# Patient Record
Sex: MALE | Race: WHITE | Employment: OTHER | URBAN - METROPOLITAN AREA
[De-identification: names, ages, dates, MRNs, and addresses within clinical notes are randomized per-mention and may not be internally consistent; named-entity substitution may affect disease eponyms.]

---

## 2019-07-16 ENCOUNTER — HOSPITAL ENCOUNTER (OUTPATIENT)
Dept: PHYSICAL THERAPY | Age: 84
Discharge: HOME | End: 2019-07-16
Payer: MEDICARE

## 2019-07-16 PROCEDURE — 97110 THERAPEUTIC EXERCISES: CPT | Performed by: PHYSICAL THERAPIST

## 2019-07-16 PROCEDURE — 97530 THERAPEUTIC ACTIVITIES: CPT | Performed by: PHYSICAL THERAPIST

## 2019-07-16 PROCEDURE — 97162 PT EVAL MOD COMPLEX 30 MIN: CPT | Performed by: PHYSICAL THERAPIST

## 2019-07-16 NOTE — PROGRESS NOTES
Corry Dennison. : 3/5/1932  Primary: Waldemar Lawrence Medicare Advantage  Secondary:  [address]  [phone]   [phone]      OUTPATIENT PHYSICAL THERAPY: Daily Treatment Note  2019              ICD-10: Treatment Diagnosis: Other abnormalities of gait and mobility (R26.89)  Muscle weakness (generalized) (M62.81)       _________________________________________________________________________   TREATMENT PLAN:  Effective Dates: 2019 TO 10/14/2019 (90 days). Frequency/Duration: 2-3 times a week for 90 Days    GOALS: (Goals have been discussed and agreed upon with patient.)  Short-Term Functional Goals: Time Frame: 3 weeks  1. Pt will be able to improve his Timed Up and Go score (using a cane) by at least 5 seconds noting improving transfer and gait speed. 2. Pt will be able to complete a full bridge noting improving functional strength and improving mobility . 3. Pt will be instructed in correct use of a rollator and quad cane to improve stability with gait. Discharge Goals: Time Frame: 8-12 weeks  1. Pt will be able to sit to stand on first attempt from a standard chair. 2. Pt will be able to safely ambulate with least restrictive device without reaching for furniture or walls to balance and without report of any falls consistently for at least 2 weeks. 3. Pt will be able to decrease Timed Up and Go score (using least restrictive device) < 20 seconds noting significant improved functional mobility. Pre-treatment Symptoms/Complaints:  Frequent falls  Pain: Initial: 0/10 No c/o pain, but pt's wife reports frequent falls, more over the past month. Post Session:  No c/o   Medications Last Reviewed:  2019    Updated Objective Findings:  See evaluation note from today     TREATMENT:   Therapeutic Exercise: (SEE MINUTES BELOW):  Exercises per grid below to improve mobility, strength and balance. Required moderate visual, verbal and tactile cues to promote proper body alignment and promote proper body posture. Therapeutic Activity: (SEE MINUTES BELOW) Therapeutic activities performed with Enmanuel to improve transfers and improve safety with ambulation       Date: 7/16/19  Eval       Modalities:                                Manual Therapy:                                                        Therapeutic Exercises: 10 minutes       Supine hamstring stretch 2 H 30        hooklying hip abduction with theraband Blue x15                                                                                                                                       Therapeutic Activities: 20 minutes       Sit to stand  From elevated high/low table  x10 CGA       bridging x10       Gait training with prong cane Ambulating in clinic 20 ft then turn and ambulate 20 ft back 3x                                                                   HEP: Pt and his wife instructed in HEP and issued handout. [employer] Portal  Treatment/Session Summary:    · Response to Treatment:  Pt has difficulty hearing and also is slow to follow commands. Pt was instructed to increase stride length with gait and how to properly place the cane. Pt needed HHA and cane as reaches for furniture when walking. Pt's wife was instructed to bring his rollator walker for safety, but she states he only uses for walking further distances such as the dining fox. Pt prefers the cane. · Communication/Consultation:  None today  · Equipment provided today:  None today  · Recommendations/Intent for next treatment session: Next visit will focus on further gait and balance training, LE stretching and strengthening.      Total Treatment Billable Duration:  55 minutes  PT Patient Time In/Time Out  Time In: 1320  Time Out: 1415  Shaila Araya, PT

## 2019-07-16 NOTE — THERAPY EVALUATION
Sunita Florentino. : 3/5/1932 [address] @ [address]  [phone]   [phone]        OUTPATIENT PHYSICAL THERAPY:Initial Assessment 2019        ICD-10: Treatment Diagnosis: Other abnormalities of gait and mobility (R26.89)  Muscle weakness (generalized) (M62.81)  Precautions/Allergies: Carbidopa-Levodopa  Patient has no allergy information on record. Fall Risk Score:    Ambulatory/Rehab Services H2 Model Falls Risk Assessment    Risk Factors:       (4)  Confusion/Disorientation/Impulsivity       (1)  Gender [Male]       (5)  History of Recent Falls [w/in 3 months] Ability to Rise from Chair:       (3)  Multiple attempts, but successful1    Falls Prevention Plan:       Physical Limitations to Exercise (specify):  requires CGA during standing exercises and will use gait belt        Mobility Assistance Device (specify):  recommend walker; pt currently uses canes and walker   Total: (5 or greater = High Risk): 13     [facility] of [employer]. All Rights Reserved. [facility] Patent #[id number]. Federal Law prohibits the replication, distribution or use without written permission from [facility] [place]     MD Orders: Evaluate and Treat: Recurrent Falls, Parkinson's Disease MEDICAL/REFERRING DIAGNOSIS:  Parkinson's disease [G20]  Repeated falls [R29.6]   DATE OF ONSET: chronic   REFERRING PHYSICIAN: Marce James MD  RETURN PHYSICIAN APPOINTMENT: 20     INITIAL ASSESSMENT:  Mr. Jackie Rivas is an 79 y/o male with Parkinson's Disease and has recently began falling. Pt has difficulty walking, decreased balance, and decreased flexibility. Pt has increased difficulty with transfers. Pt has poor posture, decreased overall strength, and decreased flexibility.   Pt will benefit from PT for gait and balance training as well as stretching and core/LE strengthening to improve safety with gait and transfers as well as decrease fall risk    PROBLEM LIST (Impacting functional limitations):  1. Decreased Strength  2. Decreased ADL/Functional Activities  3. Decreased Transfer Abilities  4. Decreased Ambulation Ability/Technique  5. Decreased Balance  6. Decreased Activity Tolerance  7. Decreased Flexibility/Joint Mobility  8. Decreased [place] with Home Exercise Program INTERVENTIONS PLANNED:  1. Balance Exercise  2. Gait Training  3. Home Exercise Program (HEP)  4. Manual Therapy  5. Range of Motion (ROM)  6. Therapeutic Activites  7. Therapeutic Exercise/Strengthening  8. Transfer Training    TREATMENT PLAN:  Effective Dates: 7/16/2019 TO 10/14/2019 (90 days). Frequency/Duration: 2-3 times a week for 90 Days    GOALS: (Goals have been discussed and agreed upon with patient.)  Short-Term Functional Goals: Time Frame: 3 weeks  1. Pt will be able to improve his Timed Up and Go score (using a cane) by at least 5 seconds noting improving transfer and gait speed. 2. Pt will be able to complete a full bridge noting improving functional strength and improving mobility . 3. Pt will be instructed in correct use of a rollator and quad cane to improve stability with gait. Discharge Goals: Time Frame: 8-12 weeks  1. Pt will be able to sit to stand on first attempt from a standard chair. 2. Pt will be able to safely ambulate with least restrictive device without reaching for furniture or walls to balance and without report of any falls consistently for at least 2 weeks. 3. Pt will be able to decrease Timed Up and Go score (using least restrictive device) < 20 seconds noting significant improved functional mobility. Rehabilitation Potential For Stated Goals: Fair  Regarding Alfred Krabbe Jr.'s therapy, I certify that the treatment plan above will be carried out by a therapist or under their direction.   Thank you for this referral,  Shaila Araya, PT       Referring Physician Signature: Luciano Ruiz MD              Date HISTORY:   History of Present Injury/Illness (Reason for Referral):  Pt's wife reports pt was referred to PT after consulting MD about recent increase in falls. Over the past month, he has fallen 4x and without injury. In the previous 3 months, he had fallen only twice during that time. Pt has Parkinson's and a history of TIA. Pt's wife reports his falls have been standing up from getting out of chair with his legs appearing to \"go weak. \" Also, pt has fallen by missing a chair when sitting back and also he fell in the bathroom with unknown cause. Pt's wife states his memory is poor. Pt's wife had him quit using a quad cane because he was safely using the cane. He also has long walking \"poles\" and a prong cane he uses around the home. Pt's wife states he only uses his rollator if they are going to the dining fox or out for longer community distances. Past Medical History/Comorbidities:   Mr. Nicky Mendoza  has no past medical history on file. Mr. Nicky Mendoza  has no past surgical history on file. TIA  Social History/Living Environment:     lives independent living on 2nd floor with his wife at the [facility] Level of Function/Work/Activity:  Independent with dressing/bathing, pt's wife assists with household tasks meals and often eat in dining fox  Current Medications:  No current outpatient medications on file.  pt's wife reports he only takes vitamins   Date Last Reviewed:  7/16/2019   # of Personal Factors/Comorbidities that affect the Plan of Care: 1-2: MODERATE COMPLEXITY   EXAMINATION:   Observation/Orthostatic Postural Assessment:          Increased forward flexed posture; kyphotic, severe forward head posture; increased B knee flexion in standing  Palpation: unremarkable   ROM:    Increased stiffness throughout LEs with hip extension tight to achieve neutral but could with AAROM; SLR 50 ° BLE;   Knee extension lacking -14 ° L from full extension;   -7 °  R                    Strength:     Date:  7/16/19 Date:   Date:     LE MMT Left Right Left Right Left Right   Hip Flex (L1/L2) 3+/5 3+/5       Hip Abd (glut med) 3+/5 3+/5       Hip Ext minmial bridge only        Quad    ( L3/4) 3+/5 3+/5       Hamstring 3+/5 3+/5       Anterior Tib (L4/L5) 3+/5 3+/5       Gastroc (S1/2)         EHL (L5)                             Special Tests: see Timed Up and Go below; pt could not recall his birth date, the president or what he had for breakfast, pt did know his son's name  Neurological Screen: intact to light touch BLEs  Functional Mobility:         Gait/Ambulation:  Shuffling, very short strides, crouched flexed knees; using prong cane in R hand, cane too far out in front and tilted at base        Transfers:  Slow but Mod I with multiple attempts with sit to stand using BUEs        Bed Mobility:  Mod I with increased time         Stairs:  Pt's wife reports he can perform with rail and stairs but slow   Balance:  Poor; requires UE assist to steady balance; Romberg needed assist to assume and unable to hold but 5 seconds   Body Structures Involved:  1. Nerves  2. Bones  3. Joints  4. Muscles Body Functions Affected:  1. Neuromusculoskeletal  2. Movement Related Activities and Participation Affected:  1. Learning and Applying Knowledge  2. General Tasks and Demands  3. Communication  4. Mobility  5. Self Care  6. Domestic Life  7. Interpersonal Interactions and Relationships  8. Community, Social and [place]   # of elements that affect the Plan of Care: 4+: HIGH COMPLEXITY   CLINICAL PRESENTATION:   Presentation: Evolving clinical presentation with changing clinical characteristics: MODERATE COMPLEXITY   CLINICAL DECISION MAKING:   Tool Used: Timed Up and Go (TUG)  Score:  Initial: 45 seconds Most Recent: X seconds (Date: -- )   Interpretation of Score:  The test measures, in seconds, the time taken by an individual to stand up from a standard arm chair (seat height 46 cm [18 in], arm height 65 cm [25.6 in]), walk a distance of 3 meters (118 in, approx 10 ft), turn, walk back to the chair and sit down. If the individual takes longer than 14 seconds to complete TUG, this indicates risk for falls. Medical Necessity:   · Patient is expected to demonstrate progress in strength, range of motion, balance and functional technique to improve safety during transfers and gait. Reason for Services/Other Comments:  · Patient continues to require modification of therapeutic interventions to increase complexity of exercises.    Use of outcome tool(s) and clinical judgement create a POC that gives a: Questionable prediction of patient's progress: MODERATE COMPLEXITY       Total Treatment Duration:  PT Patient Time In/Time Out  Time In: 1320  Time Out: 1415     Shaila Araya, PT

## 2019-07-18 ENCOUNTER — HOSPITAL ENCOUNTER (OUTPATIENT)
Dept: PHYSICAL THERAPY | Age: 84
End: 2019-07-18
Payer: MEDICARE

## 2019-07-19 ENCOUNTER — HOSPITAL ENCOUNTER (OUTPATIENT)
Dept: PHYSICAL THERAPY | Age: 84
Discharge: HOME | End: 2019-07-19
Payer: MEDICARE

## 2019-07-19 PROCEDURE — 97110 THERAPEUTIC EXERCISES: CPT

## 2019-07-19 PROCEDURE — 97530 THERAPEUTIC ACTIVITIES: CPT

## 2019-07-19 NOTE — PROGRESS NOTES
Juan Silveira. : 3/5/1932  Primary: Maciel Hdz [employer] Medicare Advantage  Secondary:  [address] @ [address]  [facility] [address]  [phone]   [phone]      OUTPATIENT PHYSICAL THERAPY: Daily Treatment Note  2019              ICD-10: Treatment Diagnosis: Other abnormalities of gait and mobility (R26.89)  Muscle weakness (generalized) (M62.81)       _________________________________________________________________________   TREATMENT PLAN:  Effective Dates: 2019 TO 10/14/2019 (90 days). Frequency/Duration: 2-3 times a week for 90 Days    GOALS: (Goals have been discussed and agreed upon with patient.)  Short-Term Functional Goals: Time Frame: 3 weeks  1. Pt will be able to improve his Timed Up and Go score (using a cane) by at least 5 seconds noting improving transfer and gait speed. 2. Pt will be able to complete a full bridge noting improving functional strength and improving mobility . 3. Pt will be instructed in correct use of a rollator and quad cane to improve stability with gait. Discharge Goals: Time Frame: 8-12 weeks  1. Pt will be able to sit to stand on first attempt from a standard chair. 2. Pt will be able to safely ambulate with least restrictive device without reaching for furniture or walls to balance and without report of any falls consistently for at least 2 weeks. 3. Pt will be able to decrease Timed Up and Go score (using least restrictive device) < 20 seconds noting significant improved functional mobility. Pre-treatment Symptoms/Complaints:  Pt and spouse report no fall in last 2 days (since previous tx session)     Pain:  Post Session:  No c/o   Medications Last Reviewed:  2019    Updated Objective Findings:  No updated finding today     TREATMENT:   Therapeutic Exercise: (SEE MINUTES BELOW):  Exercises per grid below to improve mobility, strength and balance.   Required moderate visual, verbal and tactile cues to promote proper body alignment and promote proper body posture. Therapeutic Activity: (SEE MINUTES BELOW) Therapeutic activities performed with Enmanuel to improve transfers and improve safety with ambulation       Date: 7/16/19  Eval 7/19/19  Visit 2      Modalities:                                Manual Therapy:                                                        Therapeutic Exercises: 10 minutes 35 mins      Supine hamstring stretch 2 H 30        hooklying hip abduction with theraband Blue x15       Seated march - alternating   3# x20B      LAQ alternating  In chair       Seated clamshells  grn tb x20      Seated calf raises  3# on knees x20      Seated dorsiflexion  Red tb x20                      Standing B shldr extn  CGA with gait belt  x10                      Supine shoulder flexion  Red tb x10ea L&R      Supine shoulder horz abd  Red tb x10       Supine LTR  X20                                                              Therapeutic Activities: 20 mins  25 mins      Sit to stand  From elevated high/low table  x10 CGA x10 chair plus 1 cushion, CGA      bridging x10 X10 also  X10 with hip add ball sqz      Gait training with prong cane Ambulating in clinic 20 ft then turn and ambulate 20 ft back 3x   Ambulating in clinic 20 ft then turn and ambulate 20 ft back 4x total between exercises      Standing marching on AirEx foam pad  x20B CGA                                                        HEP: Pt and his wife instructed in HEP and issued handout. [employer] Portal  Treatment/Session Summary:    · Response to Treatment: Gait belt and SBA/CGA provided throughout treatment session. Pt has difficulty hearing, He demonstrates slow processing of requests and follow through. Pt demonstrates altered gait with decreased step length, has more shuffling gait pattern. . Pt needed HHA and cane as reaches for furniture when walking. Pt's wife brought in [facility] for gait training. Quad cane his heavy and more difficult to control. · Communication/Consultation:  None today  · Equipment provided today:  None today  · Recommendations/Intent for next treatment session: Next visit will focus on further gait and balance training, LE stretching and strengthening.      Total Treatment Billable Duration:  60 minutes  PT Patient Time In/Time Out  Time In: 1330  Time Out: [address] PTA

## 2019-07-23 ENCOUNTER — APPOINTMENT (OUTPATIENT)
Dept: PHYSICAL THERAPY | Age: 84
End: 2019-07-23
Payer: MEDICARE

## 2019-07-25 ENCOUNTER — APPOINTMENT (OUTPATIENT)
Dept: PHYSICAL THERAPY | Age: 84
End: 2019-07-25
Payer: MEDICARE

## 2019-07-26 ENCOUNTER — HOSPITAL ENCOUNTER (OUTPATIENT)
Dept: PHYSICAL THERAPY | Age: 84
Discharge: HOME | End: 2019-07-26
Payer: MEDICARE

## 2019-07-26 PROCEDURE — 97110 THERAPEUTIC EXERCISES: CPT

## 2019-07-26 NOTE — PROGRESS NOTES
Rigo Bergman. : 3/5/1932  Primary: Julita Lawrence Medicare Advantage  Secondary:  Erma Neff @ [address]  [address] [facility] [place].  [phone]   [phone]      OUTPATIENT PHYSICAL THERAPY: Daily Treatment Note  2019              ICD-10: Treatment Diagnosis: Other abnormalities of gait and mobility (R26.89)  Muscle weakness (generalized) (M62.81)       _________________________________________________________________________   TREATMENT PLAN:  Effective Dates: 2019 TO 10/14/2019 (90 days). Frequency/Duration: 2-3 times a week for 90 Days    GOALS: (Goals have been discussed and agreed upon with patient.)  Short-Term Functional Goals: Time Frame: 3 weeks  1. Pt will be able to improve his Timed Up and Go score (using a cane) by at least 5 seconds noting improving transfer and gait speed. 2. Pt will be able to complete a full bridge noting improving functional strength and improving mobility . 3. Pt will be instructed in correct use of a rollator and quad cane to improve stability with gait. Discharge Goals: Time Frame: 8-12 weeks  1. Pt will be able to sit to stand on first attempt from a standard chair. 2. Pt will be able to safely ambulate with least restrictive device without reaching for furniture or walls to balance and without report of any falls consistently for at least 2 weeks. 3. Pt will be able to decrease Timed Up and Go score (using least restrictive device) < 20 seconds noting significant improved functional mobility. Pre-treatment Symptoms/Complaints:  Pt and spouse report no fall in last 2 days (since previous tx session)     Pain:  Post Session:  No c/o   Medications Last Reviewed:  2019    Updated Objective Findings:  No updated finding today     TREATMENT:   Therapeutic Exercise: (SEE MINUTES BELOW):  Exercises per grid below to improve mobility, strength and balance.   Required moderate visual, verbal and tactile cues to promote proper body alignment and promote proper body posture. Therapeutic Activity: (SEE MINUTES BELOW) Therapeutic activities performed with Enmanuel to improve transfers and improve safety with ambulation       Date: 7/16/19  Eval 7/19/19  Visit 2 07/26/19 visit 3     Modalities:                                Manual Therapy:                                                        Therapeutic Exercises: 10 minutes 35 mins 55 min     Supine hamstring stretch 2 H 30        hooklying hip abduction with theraband Blue x15       Seated march - alternating   3# x20B 2x15 BLE alternating     LAQ alternating  In chair  2x15 BLE     Seated clamshells  grn tb x20 2x10 green TB     Seated calf raises  3# on knees x20      Seated dorsiflexion  Red tb x20      Toe/ Heel raises   2x10 BLE alternating             Standing B shldr extn  CGA with gait belt  x10      Standing side/ forward wt shifts    X10 each direction     Standing reach activities   x7-10 crossing midline     Supine shoulder flexion  Red tb x10ea L&R      Supine shoulder horz abd  Red tb x10       Supine LTR  X20      Sit to stand   X10 with CGA     Step ups   X10 R/L on [employer] cushion     Standing/ postural exercises   5 min- incr. cues     Gait training   2x20, 2x20 with SPC                             Therapeutic Activities: 20 mins  25 mins      Sit to stand  From elevated high/low table  x10 CGA x10 chair plus 1 cushion, CGA      bridging x10 X10 also  X10 with hip add ball sqz      Gait training with prong cane Ambulating in clinic 20 ft then turn and ambulate 20 ft back 3x   Ambulating in clinic 20 ft then turn and ambulate 20 ft back 4x total between exercises      Standing marching on AirEx foam pad  x20B CGA                                                        HEP: Pt and his wife instructed in HEP and issued handout.      [employer] Portal  Treatment/Session Summary:    · Response to Treatment: Gait belt and SBA/CGA provided throughout treatment session. Pt has difficulty hearing- better hearing on R side with continued slow process processing of instructions/ cues and follow through. Pt demonstrates altered gait with decreased step length with festinating gait pattern- when he responds to cues for increased step length his posture corrects as well. Plan to continue on land at next visit. · Communication/Consultation:  None today  · Equipment provided today:  None today  · Recommendations/Intent for next treatment session: Next visit will focus on further gait and balance training, LE stretching and strengthening.      Total Treatment Billable Duration:  55 minutes  PT Patient Time In/Time Out  Time In: 1100  Time Out: Matti 90, PT

## 2019-08-01 ENCOUNTER — APPOINTMENT (OUTPATIENT)
Dept: PHYSICAL THERAPY | Age: 84
End: 2019-08-01
Payer: MEDICARE

## 2019-08-15 ENCOUNTER — HOSPITAL ENCOUNTER (OUTPATIENT)
Dept: PHYSICAL THERAPY | Age: 84
Discharge: HOME | End: 2019-08-15
Payer: MEDICARE

## 2019-08-15 PROCEDURE — 97110 THERAPEUTIC EXERCISES: CPT

## 2019-08-15 NOTE — PROGRESS NOTES
Gerardantonio Brock. : 3/5/1932  Primary: Mitul Lawrence Medicare Advantage  Secondary:  Janis Guerrero @ [address]  [phone]   [phone]      OUTPATIENT PHYSICAL THERAPY: Daily Treatment Note  8/15/2019              ICD-10: Treatment Diagnosis: Other abnormalities of gait and mobility (R26.89)  Muscle weakness (generalized) (M62.81)       _________________________________________________________________________   TREATMENT PLAN:  Effective Dates: 2019 TO 10/14/2019 (90 days). Frequency/Duration: 2-3 times a week for 90 Days    GOALS: (Goals have been discussed and agreed upon with patient.)  Short-Term Functional Goals: Time Frame: 3 weeks  1. Pt will be able to improve his Timed Up and Go score (using a cane) by at least 5 seconds noting improving transfer and gait speed. 2. Pt will be able to complete a full bridge noting improving functional strength and improving mobility . 3. Pt will be instructed in correct use of a rollator and quad cane to improve stability with gait. Discharge Goals: Time Frame: 8-12 weeks  1. Pt will be able to sit to stand on first attempt from a standard chair. 2. Pt will be able to safely ambulate with least restrictive device without reaching for furniture or walls to balance and without report of any falls consistently for at least 2 weeks. 3. Pt will be able to decrease Timed Up and Go score (using least restrictive device) < 20 seconds noting significant improved functional mobility. Pre-treatment Symptoms/Complaints:  Pt states \"I feel ok. \"    Pain:  0/10 Post Session:  No c/o   Medications Last Reviewed:  8/15/2019    Updated Objective Findings:  Pt transferred from standgng to sitting with increased control with descent     TREATMENT:   Therapeutic Exercise: (SEE MINUTES BELOW):  Exercises per grid below to improve mobility, strength and balance.   Required moderate visual, verbal and tactile cues to promote proper body alignment and promote proper body posture. Therapeutic Activity: (SEE MINUTES BELOW) Therapeutic activities performed with Enmanuel to improve transfers and improve safety with ambulation       Date: 7/16/19  Eval 7/19/19  Visit 2 07/26/19 visit 3 8/15/19 (visit 4)    Modalities:                                Manual Therapy:                                                        Therapeutic Exercises: 10 minutes 35 mins 55 min 60 min with CGA and gait belt    Supine hamstring stretch 2 H 30        hooklying hip abduction with theraband Blue x15   Side lying abduction 2x10 B with tactile cues    Seated march - alternating   3# x20B 2x15 BLE alternating With raised mat table for UE support x30 standing    LAQ alternating  In chair  2x15 BLE x20 B LE with 3#    Seated clamshells  grn tb x20 2x10 green TB     Seated calf raises  3# on knees x20  Standing 3x10 with UE support and CGA    Seated dorsiflexion  Red tb x20      Toe/ Heel raises   2x10 BLE alternating DF with blue band x20 B and PF with 6# weight seated x20 BLE    Sit to stand with alternating toe tap    x10 with CGA and cane    Standing B shldr extn  CGA with gait belt  x10      Standing side/ forward wt shifts    X10 each direction     Standing reach activities   x7-10 crossing midline Ball toss- seated x30 without feet touching floor and x30 standing with CGA and pt tossing to another therapist    Supine shoulder flexion  Red tb x10ea L&R      Supine shoulder horz abd  Red tb x10       Supine LTR  X20      Sit to stand   X10 with CGA 2x10 CGA and x5 holding ball    Step ups   X10 R/L on AIREX cushion Onto 4 in step with UE support on L side and HHA on R side x10    Standing/ postural exercises   5 min- incr.  cues     Gait training   2x20, 2x20 with SPC 20 feet, 3 times, HHA and CGA with cane    bridges    2x10                    Therapeutic Activities: 20 mins  25 mins      Sit to stand  From elevated high/low table  x10 CGA x10 chair plus 1 cushion, CGA      bridging x10 X10 also  X10 with hip add ball sqz      Gait training with prong cane Ambulating in clinic 20 ft then turn and ambulate 20 ft back 3x   Ambulating in clinic 20 ft then turn and ambulate 20 ft back 4x total between exercises      Standing marching on AirEx foam pad  x20B CGA                                                        HEP: Pt and his wife instructed in HEP and issued handout. [employer]  Treatment/Session Summary:    · Response to Treatment: Pt is hard of hearing but he follows directions well. Pt has difficulty shifting weight from one LE to the next. His wife states that he has poor recall and she was advised to work on cueing the pt to slide forward in his chair before he attempts to stand up. He reported increased fatigue at the end of treatment and the therapist walked him to the car. Plan to continue on land at next visit. · Communication/Consultation:  None today  · Equipment provided today:  None today  · Recommendations/Intent for next treatment session: Next visit will focus on further gait and balance training, LE stretching and strengthening.      Total Treatment Billable Duration:  60 minutes  PT Patient Time In/Time Out  Time In: 0215  Time Out: 0315  Stephanie Myers, PTA

## 2019-08-20 ENCOUNTER — HOSPITAL ENCOUNTER (OUTPATIENT)
Dept: PHYSICAL THERAPY | Age: 84
Discharge: HOME | End: 2019-08-20
Payer: MEDICARE

## 2019-08-20 PROCEDURE — 97110 THERAPEUTIC EXERCISES: CPT | Performed by: PHYSICAL THERAPIST

## 2019-08-20 NOTE — PROGRESS NOTES
Filiberto Kay. : 3/5/1932  Primary: Cathy Simple [employer] Medicare Advantage  Secondary:  Revonda Burkitt @ [address]  [phone]   [phone]      OUTPATIENT PHYSICAL THERAPY: Daily Treatment Note  2019              ICD-10: Treatment Diagnosis: Other abnormalities of gait and mobility (R26.89)  Muscle weakness (generalized) (M62.81)       _________________________________________________________________________   TREATMENT PLAN:  Effective Dates: 2019 TO 10/14/2019 (90 days). Frequency/Duration: 2-3 times a week for 90 Days    GOALS: (Goals have been discussed and agreed upon with patient.)  Short-Term Functional Goals: Time Frame: 3 weeks  1. Pt will be able to improve his Timed Up and Go score (using a cane) by at least 5 seconds noting improving transfer and gait speed. 2. Pt will be able to complete a full bridge noting improving functional strength and improving mobility . 3. Pt will be instructed in correct use of a rollator and quad cane to improve stability with gait. Discharge Goals: Time Frame: 8-12 weeks  1. Pt will be able to sit to stand on first attempt from a standard chair. 2. Pt will be able to safely ambulate with least restrictive device without reaching for furniture or walls to balance and without report of any falls consistently for at least 2 weeks. 3. Pt will be able to decrease Timed Up and Go score (using least restrictive device) < 20 seconds noting significant improved functional mobility. Pre-treatment Symptoms/Complaints:  Pt wife stated there has not been any falls since beginning PT.      Pain:  0/10 Post Session:  No c/o   Medications Last Reviewed:  2019    Updated Objective Findings:  Pt transferred from standing to sitting with increased control with descent     TREATMENT:   Therapeutic Exercise: (SEE MINUTES BELOW):  Exercises per grid below to improve mobility, strength and balance. Required moderate visual, verbal and tactile cues to promote proper body alignment and promote proper body posture.      Therapeutic Activity: (SEE MINUTES BELOW) Therapeutic activities performed with Enmanuel to improve transfers and improve safety with ambulation       Date: 7/16/19  Eval 7/19/19  Visit 2 07/26/19 visit 3 8/15/19 (visit 4) 8/20/19  Visit 5   Modalities:                                Manual Therapy:                                                        Therapeutic Exercises:  Pt has difficulty hearing 10 minutes 35 mins 55 min 60 min with CGA and gait belt 55 minutes CGA with gait belt   Supine hamstring stretch 2 H 30        hooklying hip abduction with theraband Blue x15   Side lying abduction 2x10 B with tactile cues Blue x30   Seated march - alternating   3# x20B 2x15 BLE alternating With raised mat table for UE support x30 standing Without LE support 2# x15   LAQ alternating  In chair  2x15 BLE x20 B LE with 3# 3# 2x10 BLE   Seated clamshells  grn tb x20 2x10 green TB     Seated calf raises  3# on knees x20  Standing 3x10 with UE support and CGA Standing 2x15 with UE support and CGA   Seated dorsiflexion  Red tb x20      Toe/ Heel raises   2x10 BLE alternating DF with blue band x20 B and PF with 6# weight seated x20 BLE    Sit to stand with alternating toe tap    x10 with CGA and cane    Standing B shldr extn  CGA with gait belt  x10   Seated rows blue x25   Standing side/ forward wt shifts    X10 each direction  x10 each with reaching overhead    Standing reach activities   x7-10 crossing midline Ball toss- seated x30 without feet touching floor and x30 standing with CGA and pt tossing to another therapist Ball toss x30 with CGA; seated reaching across body x 5 CGA   Supine shoulder flexion  Red tb x10ea L&R      Supine shoulder horz abd  Red tb x10       Supine LTR  X20      Sit to stand   X10 with CGA 2x10 CGA and x5 holding ball 2x10 seated with airex CGA   Step ups   X10 R/L on AIREX cushion Onto 4 in step with UE support on L side and HHA on R side x10 6\" x15 with rail and HHA   Standing/ postural exercises   5 min- incr. cues  Cues throughout for upright posture   Gait training   2x20, 2x20 with SPC 20 feet, 3 times, HHA and CGA with cane 6 x 20 ft HHA and cane   bridges    2x10 x25   Balance: romberg with light perturbations     CGA 3 x 20 seconds                           Therapeutic Activities: 20 mins  25 mins      Sit to stand  From elevated high/low table  x10 CGA x10 chair plus 1 cushion, CGA      bridging x10 X10 also  X10 with hip add ball sqz      Gait training with prong cane Ambulating in clinic 20 ft then turn and ambulate 20 ft back 3x   Ambulating in clinic 20 ft then turn and ambulate 20 ft back 4x total between exercises      Standing marching on AirEx foam pad  x20B CGA                                                        HEP: Pt and his wife instructed in HEP and issued handout. [employer] Portal  Treatment/Session Summary:    · Response to Treatment: Began therapy by placing gait belt on pt. Pt was very agitated and yelled at PT. Pt's wife stated he does this to her sometimes and he may have been disoriented because he does not normally get up out of bed this early in the day. Pt was cooperative the remainder of the session. Pt needs one step commands loud and clear. Pt still ambulating with short strides but with cues, pt increases stride lengths. Pt also tends to stare at the ground and needs constant postural cues during the session. Pt seems to using cane more safely as it is placed correctly ~75% of the time now with his strides. · Communication/Consultation:  None today  · Equipment provided today:  None today  · Recommendations/Intent for next treatment session: Next visit will focus on further gait and balance training, LE stretching and strengthening.      Total Treatment Billable Duration:  55 minutes  PT Patient Time In/Time Out  Time In: 1105  Time Out: 1200  Shaila Araya, PT

## 2019-08-20 NOTE — PROGRESS NOTES
I'm clearing out my inbox in prep for [facility]. Error.   Shaila Araya, PT  3/6/20

## 2019-10-10 NOTE — THERAPY DISCHARGE
Lady William. : 3/5/1932 Nolan Bosworth @ [address]  [address] [address].  [phone]   [phone]        OUTPATIENT PHYSICAL THERAPY:Discontinuation Summary 10/10/2019        ICD-10: Treatment Diagnosis: Other abnormalities of gait and mobility (R26.89)  Muscle weakness (generalized) (M62.81)  Precautions/Allergies: Carbidopa-Levodopa  Patient has no allergy information on record. Fall Risk Score:    Ambulatory/Rehab Services H2 Model Falls Risk Assessment    Risk Factors:       (4)  Confusion/Disorientation/Impulsivity       (1)  Gender [Male]       (5)  History of Recent Falls [w/in 3 months] Ability to Rise from Chair:       (3)  Multiple attempts, but successful1    Falls Prevention Plan:       Physical Limitations to Exercise (specify):  requires CGA during standing exercises and will use gait belt        Mobility Assistance Device (specify):  recommend walker; pt currently uses canes and walker   Total: (5 or greater = High Risk): 13     [employer]. All Rights Reserved. [facility] Patent #[id number]. Federal Law prohibits the replication, distribution or use without written permission from [employer]     MD Orders: Evaluate and Treat: Recurrent Falls, Parkinson's Disease MEDICAL/REFERRING DIAGNOSIS:  Parkinson's disease [G20]  Repeated falls [R29.6]   DATE OF ONSET: chronic   REFERRING PHYSICIAN: Dick Gibson MD  RETURN PHYSICIAN APPOINTMENT: 20     INITIAL ASSESSMENT:  Mr. Temo Davis is an 81 y/o male with Parkinson's Disease and has recently began falling. Pt has difficulty walking, decreased balance, and decreased flexibility. Pt has increased difficulty with transfers. Pt has poor posture, decreased overall strength, and decreased flexibility.   Pt will benefit from PT for gait and balance training as well as stretching and core/LE strengthening to improve safety with gait and transfers as well as decrease fall risk   Discontinuation Summary:  Pt was seen for only 5 visits and did not return. Status at last visit 8/20/19: Began therapy by placing gait belt on pt. Pt was very agitated and yelled at PT. Pt's wife stated he does this to her sometimes and he may have been disoriented because he does not normally get up out of bed this early in the day. Pt was cooperative the remainder of the session. Pt needs one step commands loud and clear. Pt still ambulating with short strides but with cues, pt increases stride lengths. Pt also tends to stare at the ground and needs constant postural cues during the session. Pt seems to using cane more safely as it is placed correctly ~75% of the time now with his strides. PROBLEM LIST (Impacting functional limitations):  1. Decreased Strength  2. Decreased ADL/Functional Activities  3. Decreased Transfer Abilities  4. Decreased Ambulation Ability/Technique  5. Decreased Balance  6. Decreased Activity Tolerance  7. Decreased Flexibility/Joint Mobility  8. Decreased Darke with Home Exercise Program INTERVENTIONS PLANNED:  1. Balance Exercise  2. Gait Training  3. Home Exercise Program (HEP)  4. Manual Therapy  5. Range of Motion (ROM)  6. Therapeutic Activites  7. Therapeutic Exercise/Strengthening  8. Transfer Training    TREATMENT PLAN:  Effective Dates: 7/16/2019 TO 10/14/2019 (90 days). Frequency/Duration: 2-3 times a week for 90 Days    GOALS: (Goals have been discussed and agreed upon with patient.)  Short-Term Functional Goals: Time Frame: 3 weeks (not formally assessed yet prior to pt not returning)  1. Pt will be able to improve his Timed Up and Go score (using a cane) by at least 5 seconds noting improving transfer and gait speed. 2. Pt will be able to complete a full bridge noting improving functional strength and improving mobility . 3. Pt will be instructed in correct use of a rollator and quad cane to improve stability with gait.    Discharge Goals: Time Frame: 8-12 weeks  1. Pt will be able to sit to stand on first attempt from a standard chair. 2. Pt will be able to safely ambulate with least restrictive device without reaching for furniture or walls to balance and without report of any falls consistently for at least 2 weeks. 3. Pt will be able to decrease Timed Up and Go score (using least restrictive device) < 20 seconds noting significant improved functional mobility. Rehabilitation Potential For Stated Goals: Fair  Regarding Scarlett Taylor Jr.'s therapy, I certify that the treatment plan above will be carried out by a therapist or under their direction. Thank you for this referral,  Shaila Araya, PT                   HISTORY:   History of Present Injury/Illness (Reason for Referral):  Pt's wife reports pt was referred to PT after consulting MD about recent increase in falls. Over the past month, he has fallen 4x and without injury. In the previous 3 months, he had fallen only twice during that time. Pt has Parkinson's and a history of TIA. Pt's wife reports his falls have been standing up from getting out of chair with his legs appearing to \"go weak. \" Also, pt has fallen by missing a chair when sitting back and also he fell in the bathroom with unknown cause. Pt's wife states his memory is poor. Pt's wife had him quit using a quad cane because he was safely using the cane. He also has long walking \"poles\" and a prong cane he uses around the home. Pt's wife states he only uses his rollator if they are going to the dining fox or out for longer community distances. Past Medical History/Comorbidities:   Mr. Rossy Hernandez  has no past medical history on file. Mr. Rossy Hernandez  has no past surgical history on file. TIA  Social History/Living Environment:     lives independent living on 2nd floor with his wife at the [facility]  Prior Level of Function/Work/Activity:  Independent with dressing/bathing, pt's wife assists with household tasks meals and often eat in dining rud[employer]  Current Medications:  No current outpatient medications on file. pt's wife reports he only takes vitamins   Date Last Reviewed:  8/20/2019   EXAMINATION:   Observation/Orthostatic Postural Assessment:          Increased forward flexed posture; kyphotic, severe forward head posture; increased B knee flexion in standing  Palpation: unremarkable   ROM:    Increased stiffness throughout LEs with hip extension tight to achieve neutral but could with AAROM; SLR 50 ° BLE;   Knee extension lacking -14 ° L from full extension;   -7 °  R                    Strength:     Date:  7/16/19 Date:   Date:     LE MMT Left Right Left Right Left Right   Hip Flex (L1/L2) 3+/5 3+/5       Hip Abd (glut med) 3+/5 3+/5       Hip Ext minmial bridge only        Quad    ( L3/4) 3+/5 3+/5       Hamstring 3+/5 3+/5       Anterior Tib (L4/L5) 3+/5 3+/5       Gastroc (S1/2)         EHL (L5)                             Special Tests: see Timed Up and Go below; pt could not recall his birth date, the president or what he had for breakfast, pt did know his son's name  Neurological Screen: intact to light touch BLEs  Functional Mobility:         Gait/Ambulation:  Shuffling, very short strides, crouched flexed knees; using prong cane in R hand, cane too far out in front and tilted at base        Transfers:  Slow but Mod I with multiple attempts with sit to stand using BUEs        Bed Mobility:  Mod I with increased time         Stairs:  Pt's wife reports he can perform with rail and stairs but slow   Balance:  Poor; requires UE assist to steady balance; Romberg needed assist to assume and unable to hold but 5 seconds   Body Structures Involved:  1. Nerves  2. Bones  3. Joints  4. Muscles Body Functions Affected:  1. Neuromusculoskeletal  2. Movement Related Activities and Participation Affected:  1. Learning and Applying Knowledge  2. General Tasks and Demands  3. Communication  4. Mobility  5. Self Care  6. Domestic Life  7.  Interpersonal Interactions and Relationships  8.  Community, Social and Civic Life         Shaila Araya, PT